# Patient Record
Sex: MALE | Race: WHITE | NOT HISPANIC OR LATINO | URBAN - METROPOLITAN AREA
[De-identification: names, ages, dates, MRNs, and addresses within clinical notes are randomized per-mention and may not be internally consistent; named-entity substitution may affect disease eponyms.]

---

## 2017-10-10 ENCOUNTER — AMBULATORY SURGICAL CENTER (OUTPATIENT)
Dept: URBAN - METROPOLITAN AREA SURGERY 21 | Facility: SURGERY | Age: 52
End: 2017-10-10
Payer: OTHER GOVERNMENT

## 2017-10-10 DIAGNOSIS — D12.8 BENIGN NEOPLASM OF RECTUM: ICD-10-CM

## 2017-10-10 DIAGNOSIS — K63.5 POLYP OF COLON: ICD-10-CM

## 2017-10-10 DIAGNOSIS — D12.4 BENIGN NEOPLASM OF DESCENDING COLON: ICD-10-CM

## 2017-10-10 DIAGNOSIS — D12.5 BENIGN NEOPLASM OF SIGMOID COLON: ICD-10-CM

## 2017-10-10 DIAGNOSIS — Z12.11 ENCOUNTER FOR SCREENING FOR MALIGNANT NEOPLASM OF COLON: ICD-10-CM

## 2017-10-10 DIAGNOSIS — D12.2 BENIGN NEOPLASM OF ASCENDING COLON: ICD-10-CM

## 2017-10-10 PROCEDURE — 45385 COLONOSCOPY W/LESION REMOVAL: CPT | Mod: PT

## 2017-10-10 PROCEDURE — 45380 COLONOSCOPY AND BIOPSY: CPT | Mod: 59

## 2018-08-09 ENCOUNTER — OFFICE (OUTPATIENT)
Dept: URBAN - METROPOLITAN AREA CLINIC 78 | Facility: CLINIC | Age: 53
End: 2018-08-09

## 2018-08-09 VITALS
HEART RATE: 95 BPM | SYSTOLIC BLOOD PRESSURE: 142 MMHG | TEMPERATURE: 97.7 F | DIASTOLIC BLOOD PRESSURE: 87 MMHG | WEIGHT: 199 LBS | HEIGHT: 74 IN

## 2018-08-09 DIAGNOSIS — R10.32 LEFT LOWER QUADRANT PAIN: ICD-10-CM

## 2018-08-09 DIAGNOSIS — R13.10 DYSPHAGIA, UNSPECIFIED: ICD-10-CM

## 2018-08-09 DIAGNOSIS — R11.0 NAUSEA: ICD-10-CM

## 2018-08-09 DIAGNOSIS — K76.89 OTHER SPECIFIED DISEASES OF LIVER: ICD-10-CM

## 2018-08-09 DIAGNOSIS — E06.3 AUTOIMMUNE THYROIDITIS: ICD-10-CM

## 2018-08-09 PROCEDURE — 99215 OFFICE O/P EST HI 40 MIN: CPT

## 2018-08-09 NOTE — SERVICEHPINOTES
HOOD SANDS   is a   53   male who complains of several concerns. He reports LLQ abdominal pain onset 1 month ago around early July, described as "cramping/dull ache," occurs daily, moderate pain is short lasting and mild pain "lingers all day," no aggravating factors, improved with defecation. He notes initially having associated symptom of nausea along with "stomach pain" that lasted about 1 week then resolved on its own. He tried taking Zantac that seemed to help but no longer using. He is taking bentyl prn that has helped. He has BM 1-2x/day, BS type 3 or 4. Prior colonoscopy 10/2017 removed TA/hyperplastic polyps x 12 with recall 3 years. Denies fevers, vomiting, decreased appetite, constipation, diarrhea, blood in stool, melena, weight loss.  He mentions recently completed 1 week course of Cipro about 3 days ago that was rx by PCP due to labs from 07/2018 showing slight elevation of WBC 12.4 with neutrophils 8.2 and monocytes 1.2 along with lower abdominal pain. He had CT abd scan 08/02/18 finding "mild central biliary dilatation" (advised MRCP) and "vague 9 mm liver lesion (advised further liver labs to r/o hepatic pathology). He has h/o dysphagia s/p neck/spine fusion surgeries around 2008. He is currently having dysphagia to saliva/liquids/solid foods, occurs few times per week, sensation of food/liquid "caught" over high throat level. He had barium swallow many years ago that initially diagnosed dysphagia. No prior EGD.

## 2018-08-23 ENCOUNTER — ON CAMPUS - OUTPATIENT (OUTPATIENT)
Dept: URBAN - METROPOLITAN AREA HOSPITAL 14 | Facility: HOSPITAL | Age: 53
End: 2018-08-23
Payer: OTHER GOVERNMENT

## 2018-08-23 DIAGNOSIS — R13.13 DYSPHAGIA, PHARYNGEAL PHASE: ICD-10-CM

## 2018-08-23 DIAGNOSIS — K29.60 OTHER GASTRITIS WITHOUT BLEEDING: ICD-10-CM

## 2018-08-23 DIAGNOSIS — K20.8 OTHER ESOPHAGITIS: ICD-10-CM

## 2018-08-23 PROCEDURE — 43239 EGD BIOPSY SINGLE/MULTIPLE: CPT

## 2018-09-17 ENCOUNTER — OFFICE (OUTPATIENT)
Dept: URBAN - METROPOLITAN AREA CLINIC 78 | Facility: CLINIC | Age: 53
End: 2018-09-17

## 2018-09-17 VITALS
SYSTOLIC BLOOD PRESSURE: 117 MMHG | HEART RATE: 100 BPM | WEIGHT: 201 LBS | HEIGHT: 74 IN | DIASTOLIC BLOOD PRESSURE: 87 MMHG | TEMPERATURE: 97.9 F

## 2018-09-17 DIAGNOSIS — R93.3 ABNORMAL FINDINGS ON DIAGNOSTIC IMAGING OF OTHER PARTS OF DI: ICD-10-CM

## 2018-09-17 DIAGNOSIS — R11.0 NAUSEA: ICD-10-CM

## 2018-09-17 PROCEDURE — 99214 OFFICE O/P EST MOD 30 MIN: CPT

## 2020-12-15 PROBLEM — Z86.010 PERSONAL HISTORY OF COLON POLYPS: Status: ACTIVE | Noted: 2020-12-15

## 2021-01-27 ENCOUNTER — ON CAMPUS - OUTPATIENT (OUTPATIENT)
Dept: URBAN - METROPOLITAN AREA HOSPITAL 65 | Facility: HOSPITAL | Age: 56
End: 2021-01-27
Payer: OTHER GOVERNMENT

## 2021-01-27 DIAGNOSIS — Z86.010 PERSONAL HISTORY OF COLONIC POLYPS: ICD-10-CM

## 2021-01-27 DIAGNOSIS — D12.3 BENIGN NEOPLASM OF TRANSVERSE COLON: ICD-10-CM

## 2021-01-27 DIAGNOSIS — K63.5 POLYP OF COLON: ICD-10-CM

## 2021-01-27 PROCEDURE — 45380 COLONOSCOPY AND BIOPSY: CPT | Mod: PT | Performed by: INTERNAL MEDICINE
